# Patient Record
Sex: MALE | Race: WHITE | Employment: OTHER | URBAN - METROPOLITAN AREA
[De-identification: names, ages, dates, MRNs, and addresses within clinical notes are randomized per-mention and may not be internally consistent; named-entity substitution may affect disease eponyms.]

---

## 2018-12-05 ENCOUNTER — HOSPITAL ENCOUNTER (OUTPATIENT)
Age: 63
Setting detail: SPECIMEN
Discharge: HOME | End: 2018-12-05

## 2018-12-05 PROCEDURE — 86900 BLOOD TYPING SEROLOGIC ABO: CPT

## 2018-12-05 PROCEDURE — 86850 RBC ANTIBODY SCREEN: CPT

## 2018-12-05 PROCEDURE — 86901 BLOOD TYPING SEROLOGIC RH(D): CPT

## 2019-07-11 ENCOUNTER — APPOINTMENT (OUTPATIENT)
Dept: GENERAL RADIOLOGY | Age: 64
End: 2019-07-11
Payer: COMMERCIAL

## 2019-07-11 ENCOUNTER — HOSPITAL ENCOUNTER (EMERGENCY)
Age: 64
Discharge: HOME | End: 2019-07-11
Payer: COMMERCIAL

## 2019-07-11 VITALS
HEIGHT: 73 IN | BODY MASS INDEX: 26.51 KG/M2 | SYSTOLIC BLOOD PRESSURE: 141 MMHG | DIASTOLIC BLOOD PRESSURE: 82 MMHG | RESPIRATION RATE: 18 BRPM | OXYGEN SATURATION: 98 % | WEIGHT: 200 LBS | HEART RATE: 84 BPM | TEMPERATURE: 98 F

## 2019-07-11 DIAGNOSIS — S61.552A DOG BITE OF LEFT WRIST, INITIAL ENCOUNTER: ICD-10-CM
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
## 2019-07-11 DIAGNOSIS — S61.452A DOG BITE OF LEFT HAND, INITIAL ENCOUNTER: Primary | ICD-10-CM

## 2019-07-11 PROCEDURE — 6370000000 HC RX 637 (ALT 250 FOR IP): Performed by: PHYSICIAN ASSISTANT

## 2019-07-11 PROCEDURE — 73130 X-RAY EXAM OF HAND: CPT

## 2019-07-11 PROCEDURE — 96372 THER/PROPH/DIAG INJ SC/IM: CPT

## 2019-07-11 PROCEDURE — 6360000002 HC RX W HCPCS: Performed by: PHYSICIAN ASSISTANT

## 2019-07-11 PROCEDURE — 73100 X-RAY EXAM OF WRIST: CPT

## 2019-07-11 PROCEDURE — 99283 EMERGENCY DEPT VISIT LOW MDM: CPT

## 2019-07-11 RX ORDER — AMOXICILLIN AND CLAVULANATE POTASSIUM 875; 125 MG/1; MG/1
1 TABLET, FILM COATED ORAL ONCE
Status: COMPLETED | OUTPATIENT
Start: 2019-07-11 | End: 2019-07-11

## 2019-07-11 RX ORDER — BACITRACIN, NEOMYCIN, POLYMYXIN B 400; 3.5; 5 [USP'U]/G; MG/G; [USP'U]/G
OINTMENT TOPICAL
Qty: 1 TUBE | Refills: 0 | Status: SHIPPED | OUTPATIENT
Start: 2019-07-11 | End: 2019-07-21

## 2019-07-11 RX ORDER — KETOROLAC TROMETHAMINE 30 MG/ML
30 INJECTION, SOLUTION INTRAMUSCULAR; INTRAVENOUS ONCE
Status: COMPLETED | OUTPATIENT
Start: 2019-07-11 | End: 2019-07-11

## 2019-07-11 RX ORDER — OXYCODONE HYDROCHLORIDE AND ACETAMINOPHEN 5; 325 MG/1; MG/1
1 TABLET ORAL ONCE
Status: COMPLETED | OUTPATIENT
Start: 2019-07-11 | End: 2019-07-11

## 2019-07-11 RX ORDER — GINSENG 100 MG
CAPSULE ORAL ONCE
Status: DISCONTINUED | OUTPATIENT
Start: 2019-07-11 | End: 2019-07-12 | Stop reason: HOSPADM

## 2019-07-11 RX ORDER — AMOXICILLIN AND CLAVULANATE POTASSIUM 875; 125 MG/1; MG/1
1 TABLET, FILM COATED ORAL 2 TIMES DAILY
Qty: 20 TABLET | Refills: 0 | Status: SHIPPED | OUTPATIENT
Start: 2019-07-11 | End: 2019-07-21

## 2019-07-11 RX ADMIN — OXYCODONE HYDROCHLORIDE AND ACETAMINOPHEN 1 TABLET: 5; 325 TABLET ORAL at 21:39

## 2019-07-11 RX ADMIN — KETOROLAC TROMETHAMINE 30 MG: 30 INJECTION, SOLUTION INTRAMUSCULAR at 21:39

## 2019-07-11 RX ADMIN — AMOXICILLIN AND CLAVULANATE POTASSIUM 1 TABLET: 875; 125 TABLET, FILM COATED ORAL at 21:38

## 2019-07-11 ASSESSMENT — PAIN DESCRIPTION - PAIN TYPE: TYPE: ACUTE PAIN

## 2019-07-11 ASSESSMENT — PAIN SCALES - GENERAL: PAINLEVEL_OUTOF10: 8

## 2019-07-11 ASSESSMENT — PAIN DESCRIPTION - LOCATION: LOCATION: HAND

## 2019-07-11 ASSESSMENT — PAIN DESCRIPTION - ORIENTATION: ORIENTATION: LEFT

## 2019-07-12 NOTE — ED PROVIDER NOTES
eMERGENCY dEPARTMENT eNCOUnter         [address] [facility]    PCP: Brendolyn Hatchet, MD    CHIEF COMPLAINT    Chief Complaint   Patient presents with    Animal Bite     left arm    Arm Injury     left       HPI    Darion Mcgregor is a 60 y.o. male who presents with dog bite wound to the left hand/wrist.  Onset was at 5:30 PM this evening. Context is patient was trying to break up a fight between 2 dogs when 1 of the dogs bit him to the left hand. He sustained multiple puncture wounds to the hand dorsum, dorsal aspect of the wrist, third digit and midline proximal palm of the hand. Bleeding is controlled prior to ED arrival with pressure dressing. Tetanus is up-to-date within the last 5 years. Patient is reporting 8/10 throbbing sharp pain throughout the left hand with difficulty with range of motion of the digits and wrist extension secondary to pain and swelling. Patient is on chronic pain management with oral Vicodin for history of RA and generalized joint pain. Denying any other bite wounds to the head/face or other extremities. Dog involved in the injury is a household pet and reported to be up-to-date with all immunizations. Patient is not a diabetic. He is right-handed. Denying any numbness tingling or weakness rating to the distal left digits. No injury approximately in the left upper arm. REVIEW OF SYSTEMS    General: Denies fever or chills  Cardiac: Denies chest pain or chestwall pain. Pulmonary: Denies shortness of breath  GI: Denies abdominal pain, vomiting, or diarrhea  : No dysuria or hematuria    Denies any other muscles skeletal injuries, including elbow, shoulder,chest wall and back.     All other review of systems are negative  See HPI and nursing notes for additional information     PAST MEDICAL & SURGICAL HISTORY    Past Medical History:   Diagnosis Date    Arthritis     CAD (coronary artery disease)     Hyperlipidemia     Inability: None    Transportation needs:     Medical: None     Non-medical: None   Tobacco Use    Smoking status: Current Every Day Smoker     Packs/day: 1.00     Types: Cigarettes    Smokeless tobacco: Never Used   Substance and Sexual Activity    Alcohol use: No    Drug use: No    Sexual activity: Yes     Partners: Female   Lifestyle    Physical activity:     Days per week: None     Minutes per session: None    Stress: None   Relationships    Social connections:     Talks on phone: None     Gets together: None     Attends Spiritism service: None     Active member of club or organization: None     Attends meetings of clubs or organizations: None     Relationship status: None    Intimate partner violence:     Fear of current or ex partner: None     Emotionally abused: None     Physically abused: None     Forced sexual activity: None   Other Topics Concern    None   Social History Narrative    None     History reviewed. No pertinent family history. PHYSICAL EXAM    VITAL SIGNS: BP (!) 141/82   Pulse 84   Temp 98 °F (36.7 °C) (Oral)   Resp 18   Ht 6' 1\" (1.854 m)   Wt 200 lb (90.7 kg)   SpO2 98%   BMI 26.39 kg/m²   Constitutional:  Well developed, well nourished, no acute distress, non-toxic appearance   HENT:  Atraumatic. Normocephalic. Moist mucus membranes    Musculoskeletal:    Left Hand/Wrist: Multiple superficial non-gaping puncture wounds across the hand dorsum, dorsal aspect of the wrist joint with single puncture wound to the proximal midline palm and dorsal third digit over the PIP extensor joint line. Wounds are less than 0.5 cm in length without gaping with hand/anger movement or manual palpation. No pulsatile bleeding. Generalized soft tissue swelling throughout the hand and wrist dorsum without proximal red streaking redness warmth. No evidence of a rapidly expanding hematoma. Remaining palm and volar digits are atraumatic and nontender.   No obvious tendon involvement or

## 2021-05-11 ENCOUNTER — APPOINTMENT (OUTPATIENT)
Dept: CT IMAGING | Age: 66
DRG: 066 | End: 2021-05-11
Payer: COMMERCIAL

## 2021-05-11 ENCOUNTER — HOSPITAL ENCOUNTER (INPATIENT)
Age: 66
LOS: 2 days | Discharge: HOME | DRG: 066 | End: 2021-05-13
Attending: INTERNAL MEDICINE | Admitting: INTERNAL MEDICINE
Payer: COMMERCIAL

## 2021-05-11 DIAGNOSIS — I65.21 OCCLUSION OF RIGHT CAROTID ARTERY: ICD-10-CM

## 2021-05-11 DIAGNOSIS — I63.519 CEREBRAL INFARCTION INVOLVING MIDDLE CEREBRAL ARTERY (HCC): ICD-10-CM

## 2021-05-11 DIAGNOSIS — I67.1 ANTERIOR COMMUNICATING ARTERY ANEURYSM: ICD-10-CM

## 2021-05-11 DIAGNOSIS — R42 DIZZINESS: Primary | ICD-10-CM

## 2021-05-11 LAB
ALBUMIN SERPL-MCNC: 3.8 GM/DL (ref 3.4–5)
ALP BLD-CCNC: 38 IU/L (ref 40–129)
ALT SERPL-CCNC: 11 U/L (ref 10–40)
ANION GAP SERPL CALCULATED.3IONS-SCNC: 8 MMOL/L (ref 4–16)
AST SERPL-CCNC: 17 IU/L (ref 15–37)
BANDED NEUTROPHILS ABSOLUTE COUNT: 0.04 K/CU MM
BANDED NEUTROPHILS RELATIVE PERCENT: 1 % (ref 5–11)
BILIRUB SERPL-MCNC: 0.1 MG/DL (ref 0–1)
BUN BLDV-MCNC: 14 MG/DL (ref 6–23)
CALCIUM SERPL-MCNC: 9.3 MG/DL (ref 8.3–10.6)
CHLORIDE BLD-SCNC: 104 MMOL/L (ref 99–110)
CO2: 22 MMOL/L (ref 21–32)
CREAT SERPL-MCNC: 1.1 MG/DL (ref 0.9–1.3)
DIFFERENTIAL TYPE: ABNORMAL
EOSINOPHILS ABSOLUTE: 0 K/CU MM
EOSINOPHILS RELATIVE PERCENT: 1 % (ref 0–3)
GFR AFRICAN AMERICAN: >60 ML/MIN/1.73M2
GFR NON-AFRICAN AMERICAN: >60 ML/MIN/1.73M2
GLUCOSE BLD-MCNC: 93 MG/DL (ref 70–99)
HCT VFR BLD CALC: 37.3 % (ref 42–52)
HEMOGLOBIN: 12.2 GM/DL (ref 13.5–18)
LYMPHOCYTES ABSOLUTE: 2.3 K/CU MM
LYMPHOCYTES RELATIVE PERCENT: 53 % (ref 24–44)
MCH RBC QN AUTO: 29 PG (ref 27–31)
MCHC RBC AUTO-ENTMCNC: 32.7 % (ref 32–36)
MCV RBC AUTO: 88.6 FL (ref 78–100)
MONOCYTES ABSOLUTE: 0.3 K/CU MM
MONOCYTES RELATIVE PERCENT: 7 % (ref 0–4)
PDW BLD-RTO: 14.6 % (ref 11.7–14.9)
PLATELET # BLD: 191 K/CU MM (ref 140–440)
PMV BLD AUTO: 10.3 FL (ref 7.5–11.1)
POTASSIUM SERPL-SCNC: 4.1 MMOL/L (ref 3.5–5.1)
RBC # BLD: 4.21 M/CU MM (ref 4.6–6.2)
REASON FOR REJECTION: NORMAL
REASON FOR REJECTION: NORMAL
REJECTED TEST: NORMAL
REJECTED TEST: NORMAL
SEGMENTED NEUTROPHILS ABSOLUTE COUNT: 1.6 K/CU MM
SEGMENTED NEUTROPHILS RELATIVE PERCENT: 38 % (ref 36–66)
SODIUM BLD-SCNC: 134 MMOL/L (ref 135–145)
TOTAL PROTEIN: 7.1 GM/DL (ref 6.4–8.2)
TROPONIN T: <0.01 NG/ML
WBC # BLD: 4.2 K/CU MM (ref 4–10.5)

## 2021-05-11 PROCEDURE — 85025 COMPLETE CBC W/AUTO DIFF WBC: CPT

## 2021-05-11 PROCEDURE — 6370000000 HC RX 637 (ALT 250 FOR IP): Performed by: PHYSICIAN ASSISTANT

## 2021-05-11 PROCEDURE — 6360000002 HC RX W HCPCS: Performed by: PHYSICIAN ASSISTANT

## 2021-05-11 PROCEDURE — 99285 EMERGENCY DEPT VISIT HI MDM: CPT

## 2021-05-11 PROCEDURE — 84484 ASSAY OF TROPONIN QUANT: CPT

## 2021-05-11 PROCEDURE — 93005 ELECTROCARDIOGRAM TRACING: CPT | Performed by: PHYSICIAN ASSISTANT

## 2021-05-11 PROCEDURE — 70498 CT ANGIOGRAPHY NECK: CPT

## 2021-05-11 PROCEDURE — 96374 THER/PROPH/DIAG INJ IV PUSH: CPT

## 2021-05-11 PROCEDURE — 70450 CT HEAD/BRAIN W/O DYE: CPT

## 2021-05-11 PROCEDURE — 36415 COLL VENOUS BLD VENIPUNCTURE: CPT

## 2021-05-11 PROCEDURE — G0378 HOSPITAL OBSERVATION PER HR: HCPCS

## 2021-05-11 PROCEDURE — 1200000000 HC SEMI PRIVATE

## 2021-05-11 PROCEDURE — 83036 HEMOGLOBIN GLYCOSYLATED A1C: CPT

## 2021-05-11 PROCEDURE — 6360000004 HC RX CONTRAST MEDICATION: Performed by: PHYSICIAN ASSISTANT

## 2021-05-11 PROCEDURE — 80053 COMPREHEN METABOLIC PANEL: CPT

## 2021-05-11 RX ORDER — HYDROCODONE BITARTRATE AND ACETAMINOPHEN 7.5; 325 MG/1; MG/1
1 TABLET ORAL ONCE
Status: COMPLETED | OUTPATIENT
Start: 2021-05-11 | End: 2021-05-11

## 2021-05-11 RX ORDER — ONDANSETRON 2 MG/ML
4 INJECTION INTRAMUSCULAR; INTRAVENOUS EVERY 30 MIN PRN
Status: DISCONTINUED | OUTPATIENT
Start: 2021-05-11 | End: 2021-05-11

## 2021-05-11 RX ORDER — POLYETHYLENE GLYCOL 3350 17 G/17G
17 POWDER, FOR SOLUTION ORAL DAILY PRN
Status: DISCONTINUED | OUTPATIENT
Start: 2021-05-11 | End: 2021-05-13 | Stop reason: HOSPADM

## 2021-05-11 RX ORDER — ROPINIROLE 1 MG/1
2 TABLET, FILM COATED ORAL 2 TIMES DAILY
Status: DISCONTINUED | OUTPATIENT
Start: 2021-05-12 | End: 2021-05-13 | Stop reason: HOSPADM

## 2021-05-11 RX ORDER — NICOTINE 21 MG/24HR
1 PATCH, TRANSDERMAL 24 HOURS TRANSDERMAL ONCE
Status: COMPLETED | OUTPATIENT
Start: 2021-05-12 | End: 2021-05-12

## 2021-05-11 RX ORDER — ATORVASTATIN CALCIUM 80 MG/1
80 TABLET, FILM COATED ORAL NIGHTLY
Status: DISCONTINUED | OUTPATIENT
Start: 2021-05-12 | End: 2021-05-13 | Stop reason: HOSPADM

## 2021-05-11 RX ORDER — SODIUM CHLORIDE 0.9 % (FLUSH) 0.9 %
5-40 SYRINGE (ML) INJECTION PRN
Status: DISCONTINUED | OUTPATIENT
Start: 2021-05-11 | End: 2021-05-13 | Stop reason: HOSPADM

## 2021-05-11 RX ORDER — HYDROCODONE BITARTRATE AND ACETAMINOPHEN 7.5; 325 MG/1; MG/1
1 TABLET ORAL EVERY 8 HOURS PRN
Status: DISCONTINUED | OUTPATIENT
Start: 2021-05-11 | End: 2021-05-12

## 2021-05-11 RX ORDER — LISINOPRIL 2.5 MG/1
2.5 TABLET ORAL DAILY
Status: DISCONTINUED | OUTPATIENT
Start: 2021-05-12 | End: 2021-05-13 | Stop reason: HOSPADM

## 2021-05-11 RX ORDER — AMLODIPINE BESYLATE 10 MG/1
10 TABLET ORAL DAILY
Status: DISCONTINUED | OUTPATIENT
Start: 2021-05-12 | End: 2021-05-12

## 2021-05-11 RX ORDER — CARVEDILOL 12.5 MG/1
12.5 TABLET ORAL 2 TIMES DAILY
Status: DISCONTINUED | OUTPATIENT
Start: 2021-05-12 | End: 2021-05-12

## 2021-05-11 RX ORDER — TRAZODONE HYDROCHLORIDE 100 MG/1
100 TABLET ORAL NIGHTLY
COMMUNITY

## 2021-05-11 RX ORDER — RANOLAZINE 500 MG/1
500 TABLET, EXTENDED RELEASE ORAL 2 TIMES DAILY
Status: DISCONTINUED | OUTPATIENT
Start: 2021-05-12 | End: 2021-05-13 | Stop reason: HOSPADM

## 2021-05-11 RX ORDER — SODIUM CHLORIDE 0.9 % (FLUSH) 0.9 %
5-40 SYRINGE (ML) INJECTION 2 TIMES DAILY
Status: DISCONTINUED | OUTPATIENT
Start: 2021-05-12 | End: 2021-05-13 | Stop reason: HOSPADM

## 2021-05-11 RX ORDER — PROMETHAZINE HYDROCHLORIDE 25 MG/1
12.5 TABLET ORAL EVERY 6 HOURS PRN
Status: DISCONTINUED | OUTPATIENT
Start: 2021-05-11 | End: 2021-05-13 | Stop reason: HOSPADM

## 2021-05-11 RX ORDER — NICOTINE 21 MG/24HR
1 PATCH, TRANSDERMAL 24 HOURS TRANSDERMAL DAILY
Status: DISCONTINUED | OUTPATIENT
Start: 2021-05-12 | End: 2021-05-13 | Stop reason: HOSPADM

## 2021-05-11 RX ORDER — SODIUM CHLORIDE 9 MG/ML
25 INJECTION, SOLUTION INTRAVENOUS PRN
Status: DISCONTINUED | OUTPATIENT
Start: 2021-05-11 | End: 2021-05-13 | Stop reason: HOSPADM

## 2021-05-11 RX ORDER — ONDANSETRON 2 MG/ML
4 INJECTION INTRAMUSCULAR; INTRAVENOUS EVERY 6 HOURS PRN
Status: DISCONTINUED | OUTPATIENT
Start: 2021-05-11 | End: 2021-05-13 | Stop reason: HOSPADM

## 2021-05-11 RX ORDER — ASPIRIN 81 MG/1
81 TABLET, CHEWABLE ORAL DAILY
Status: DISCONTINUED | OUTPATIENT
Start: 2021-05-12 | End: 2021-05-13 | Stop reason: HOSPADM

## 2021-05-11 RX ADMIN — HYDROCODONE BITARTRATE AND ACETAMINOPHEN 1 TABLET: 7.5; 325 TABLET ORAL at 18:19

## 2021-05-11 RX ADMIN — ONDANSETRON 4 MG: 2 INJECTION INTRAMUSCULAR; INTRAVENOUS at 17:29

## 2021-05-11 RX ADMIN — IOPAMIDOL 75 ML: 755 INJECTION, SOLUTION INTRAVENOUS at 19:21

## 2021-05-11 ASSESSMENT — PAIN SCALES - GENERAL: PAINLEVEL_OUTOF10: 5

## 2021-05-11 NOTE — ED NOTES
Pt requesting Norco 7.5 at 1800, [address] notified      [facility]  05/11/21 7751

## 2021-05-11 NOTE — ED PROVIDER NOTES
EMERGENCY DEPARTMENT ENCOUNTER    [facility] EMERGENCY DEPARTMENT        TRIAGE CHIEF COMPLAINT:   Other (sent by cardiologist for angiogram)      [place]:  Milli Jaramillo is a 61 y.o. male that presents for abnormal outpatient carotid ultrasound. Patient states that for the last month, he has been having intermittent episodes of dizziness lightheadedness and blurred vision. He thought his symptoms were secondary to a recent prescription change in his eyeglasses. Has also had intermittent headaches as well as palpitations. Denies any facial droop, difficulty with speech or localized extremity numbness tingling or weakness. Patient does have a significant cardiac history with 7 stents, is on Brilinta. Was seen by his cardiologist Dr. Stacy Hayes who did perform a carotid ultrasound and was called to come to the emergency department as he \"had a complete blockage of his right carotid artery. \"  He states he was sent to the emergency department for an angiogram.  Patient is endorsing nausea and lightheadedness at this time. No previous history of CVA/TIA. No recent fall or head injuries. No new numbness tingling or weakness in the extremities. Denying any chest pain palpitation shortness of breath or lower leg swelling.     ROS:  General:  No fevers, no chills  Cardiovascular:  No chest pain, no palpitations  Respiratory:  No shortness of breath, no cough, no wheezing  Gastrointestinal:  No pain, no nausea, no vomiting, no diarrhea  Musculoskeletal:  No muscle pain, no joint pain  Skin:  No rash, no pruritis, no easy bruising  Neurologic:  No speech problems, no headache, no extremity numbness, no extremity tingling, no extremity weakness  Psychiatric:  No anxiety  Genitourinary:  No dysuria, no hematuria  Extremities:  No edema    Past Medical History:   Diagnosis Date    Arthritis     CAD (coronary artery disease)     Hyperlipidemia     Hypertension     Pulmonary Rozina Pacheco PA-C   4 mg at 05/11/21 1722     Current Outpatient Medications   Medication Sig Dispense Refill    ibuprofen (ADVIL;MOTRIN) 800 MG tablet Take 1 tablet by mouth every 6 hours as needed for Pain 30 tablet 0    amLODIPine (NORVASC) 10 MG tablet Take 1 tablet by mouth daily 30 tablet 3    ranolazine (RANEXA) 500 MG extended release tablet Take 500 mg by mouth 2 times daily      dicyclomine (BENTYL) 20 MG tablet Take 1 tablet by mouth 4 times daily (before meals and nightly) 120 tablet 3    carvedilol (COREG) 12.5 MG tablet Take 12.5 mg by mouth 2 times daily   5    lisinopril (PRINIVIL;ZESTRIL) 5 MG tablet Take 2.5 mg by mouth daily   11    ticagrelor (BRILINTA) 90 MG TABS tablet Take 90 mg by mouth 2 times daily      rOPINIRole (REQUIP) 1 MG tablet Take 2 mg by mouth 2 times daily       simvastatin (ZOCOR) 40 MG tablet Take 40 mg by mouth nightly.  aspirin 81 MG chewable tablet Take 81 mg by mouth daily. Allergies   Allergen Reactions    Codeine Nausea And Vomiting    Amiodarone     Dicyclomine        Nursing Notes Reviewed  PHYSICAL EXAM    VITAL SIGNS: BP (!) 143/91   Pulse 64   Temp 98.1 °F (36.7 °C) (Oral)   Resp 17   Ht 6' 1\" (1.854 m)   Wt 190 lb (86.2 kg)   SpO2 98%   BMI 25.07 kg/m²    Constitutional:  Well developed, Well nourished, In no acute distress, pleasant cooperative   Head:  Normocephalic, Atraumatic  Eyes: PERRL. EOMI. No nystagmus. Sclera clear. Conjunctiva normal, No discharge. Neck/Lymphatics: Supple, no JVD. Trachea is midline  Cardiovascular:  RRR, Normal S1 & S2  No murmurs/gallops/rubs  Peripheral Vascular: Distal pulses 2+, Capillary refill <2seconds  Respiratory:  Respirations nonnlabored, Clear to auscultation bilaterally, No retractions  Abdomen: Bowel sounds normal in all quadrants, Soft, Non tender/Nondistended, No palpable abdominal masses.   Musculoskeletal: BUE/BLE symmetrical without atrophy or deformities  Integument:  Warm, Dry, Intact, Skin turgor and texture normal  Neurologic:  Alert & oriented x3 , No focal deficits noted. Cranial nerves II through XII grossly intact. No slurred speech. No facial droop. Normal gross motor coordination & motor strength bilateral upper and lower extremities. Sensation intact. No pronator drift.   Psychiatric:  Affect appropriate      I have reviewed and interpreted all of the currently available lab results from this visit (if applicable):  Results for orders placed or performed during the hospital encounter of 05/11/21   CBC auto diff   Result Value Ref Range    WBC 4.2 4.0 - 10.5 K/CU MM    RBC 4.21 (L) 4.6 - 6.2 M/CU MM    Hemoglobin 12.2 (L) 13.5 - 18.0 GM/DL    Hematocrit 37.3 (L) 42 - 52 %    MCV 88.6 78 - 100 FL    MCH 29.0 27 - 31 PG    MCHC 32.7 32.0 - 36.0 %    RDW 14.6 11.7 - 14.9 %    Platelets 282 278 - 544 K/CU MM    MPV 10.3 7.5 - 11.1 FL    Bands Relative 1 (L) 5 - 11 %    Segs Relative 38.0 36 - 66 %    Eosinophils % 1.0 0 - 3 %    Lymphocytes % 53.0 (H) 24 - 44 %    Monocytes % 7.0 (H) 0 - 4 %    Bands Absolute 0.04 K/CU MM    Segs Absolute 1.6 K/CU MM    Eosinophils Absolute 0.0 K/CU MM    Lymphocytes Absolute 2.3 K/CU MM    Monocytes Absolute 0.3 K/CU MM    Differential Type MANUAL DIFFERENTIAL    SPECIMEN REJECTION   Result Value Ref Range    Rejected Test CHEM     Reason for Rejection UNABLE TO PERFORM TESTING:    SPECIMEN REJECTION   Result Value Ref Range    Rejected Test CHEM     Reason for Rejection UNABLE TO PERFORM TESTING:    Comprehensive Metabolic Panel   Result Value Ref Range    Sodium 134 (L) 135 - 145 MMOL/L    Potassium 4.1 3.5 - 5.1 MMOL/L    Chloride 104 99 - 110 mMol/L    CO2 22 21 - 32 MMOL/L    BUN 14 6 - 23 MG/DL    CREATININE 1.1 0.9 - 1.3 MG/DL    Glucose 93 70 - 99 MG/DL    Calcium 9.3 8.3 - 10.6 MG/DL    Albumin 3.8 3.4 - 5.0 GM/DL    Total Protein 7.1 6.4 - 8.2 GM/DL    Total Bilirubin 0.1 0.0 - 1.0 MG/DL    ALT 11 10 - 40 U/L    AST 17 15 - 37 IU/L Alkaline Phosphatase 38 (L) 40 - 129 IU/L    GFR Non-African American >60 >60 mL/min/1.73m2    GFR African American >60 >60 mL/min/1.73m2    Anion Gap 8 4 - 16   Troponin   Result Value Ref Range    Troponin T <0.010 <0.01 NG/ML        Radiographs (if obtained):  [] The following radiograph was interpreted by myself in the absence of a radiologist:   [] Radiologist's Report Reviewed:  CTA HEAD NECK W CONTRAST   Final Result   1. No acute intracranial abnormality. Chronic microvascular disease with   remote right posterior MCA distribution infarct. 2. Right common carotid occlusion with reconstitution of the right ICA at the   level the right-sided bifurcation, likely from the external carotid   circulation. 3. Mild-to-moderate narrowing involving the both proximal ostium of the   vertebral arteries otherwise large vessel occlusion or hemodynamic stenosis. 4. 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 CT HEAD WO CONTRAST   Final Result   1. No acute intracranial abnormality. Chronic microvascular disease with   remote right posterior MCA distribution infarct. 2. Right common carotid occlusion with reconstitution of the right ICA at the   level the right-sided bifurcation, likely from the external carotid   circulation. 3. Mild-to-moderate narrowing involving the both proximal ostium of the   vertebral arteries otherwise large vessel occlusion or hemodynamic stenosis. 4. 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                 CTA HEAD NECK W CONTRAST (Final result)  Result time 05/11/21 20:12:56  Final result by Dianelys Rose MD (05/11/21 20:12:56)                Impression:    1. No acute intracranial abnormality.  Chronic microvascular disease with   remote right posterior MCA distribution infarct. 2. Right common carotid occlusion with reconstitution of the right ICA at the   level the right-sided bifurcation, likely from the external carotid   circulation.    3. Mild-to-moderate narrowing involving the unremarkable.  No acute   abnormality of the salivary and thyroid glands. BONES: Multilevel degenerate change. CTA HEAD:     ANTERIOR CIRCULATION: No significant stenosis of the intracranial internal   carotid, anterior cerebral, or middle cerebral arteries.  3 mm aneurysm right   A-comm. Sueellen Payment POSTERIOR CIRCULATION: No significant stenosis of the vertebral, basilar, or   posterior cerebral arteries. No aneurysm. OTHER: No dural venous sinus thrombosis on this non-dedicated study                     CT HEAD WO CONTRAST (Final result)  Result time 05/11/21 20:12:56  Final result by Manasa Byrnes MD (05/11/21 20:12:56)                Impression:    1. No acute intracranial abnormality.  Chronic microvascular disease with   remote right posterior MCA distribution infarct. 2. Right common carotid occlusion with reconstitution of the right ICA at the   level the right-sided bifurcation, likely from the external carotid   circulation. 3. Mild-to-moderate narrowing involving the both proximal ostium of the   vertebral arteries otherwise large vessel occlusion or hemodynamic stenosis. 4. 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 Narrative:    EXAMINATION:   CTA OF THE HEAD AND NECK WITH CONTRAST; CT OF THE HEAD WITHOUT CONTRAST   5/11/2021 7:17 pm:     TECHNIQUE:   CTA of the head and neck was performed with the administration of intravenous   contrast. Multiplanar reformatted images are provided for review.  MIP images   are provided for review. Stenosis of the internal carotid arteries measured   using NASCET criteria.  Dose modulation, iterative reconstruction, and/or   weight based adjustment of the mA/kV was utilized to reduce the radiation   dose to as low as reasonably achievable.; CT of the head was performed   without the administration of intravenous contrast. Dose modulation,   iterative reconstruction, and/or weight based adjustment of the mA/kV was   utilized to reduce the radiation dose to as low as reasonably achievable. Noncontrast CT of the head with reconstructed 2-D images are also provided   for review. COMPARISON:   None. HISTORY:   ORDERING SYSTEM PROVIDED HISTORY: abnormal carotid US, eval for occlusion   right common carotid   TECHNOLOGIST PROVIDED HISTORY:   Reason for exam:->abnormal carotid US, eval for occlusion right common carotid   Decision Support Exception - unselect if not a suspected or confirmed   emergency medical condition->Emergency Medical Condition (MA)   Reason for Exam: abnormal carotid US, eval for occlusion right common carotid   Acuity: Unknown   Type of Exam: Unknown; ORDERING SYSTEM PROVIDED HISTORY: dizziness, blurred   vision x1 month   TECHNOLOGIST PROVIDED HISTORY:   Has a \"code stroke\" or \"stroke alert\" been called? ->No   Reason for exam:->dizziness, blurred vision x1 month   Decision Support Exception - unselect if not a suspected or confirmed   emergency medical condition->Emergency Medical Condition (MA)   Reason for Exam: dizziness,blurred vision for 1 month   Acuity: Unknown   Type of Exam: Unknown     FINDINGS:     CT HEAD:     BRAIN/VENTRICLES:  No acute intracranial hemorrhage or extraaxial fluid   collection. Grey-white differentiation is maintained.  No evidence of mass,   mass effect or midline shift.  No evidence of hydrocephalus.  Small focus of   gliosis right postcentral gyrus likely related to remote infarct.  Otherwise   mild chronic white matter changes of chronic microvascular disease. Intracranial vascular calcifications. ORBITS: The visualized portion of the orbits demonstrate no acute abnormality. SINUSES:  Minor ethmoid sinus disease. SOFT TISSUES/SKULL: No acute abnormality of the visualized skull or soft   tissues. CTA NECK:     AORTIC ARCH/ARCH VESSELS: No dissection or arterial injury.  Moderate focal   plaque identified involving the right brachiocephalic and left subclavian   causing mild narrowing. pertinent Lab data and radiographic results reviewed with patient at bedside. The patient and/or the family were informed of the results of any tests/labs/imaging, the treatment plan, and time was allotted to answer questions. Clinical Impression:  1. Dizziness    2. Occlusion of right carotid artery    3. Anterior communicating artery aneurysm    4. Cerebral infarction involving middle cerebral artery [facility])        Patient presents for abnormal outpatient carotid ultrasound. On exam, well-appearing pleasant 24-year-old male, sitting upright in bed, pleasant cooperative. Nonfocal neurological exam.  Cranial nerves II to XII grossly intact. Spontaneously moving all extremities with equal strength range of motion and sensation. Unremarkable cardiopulmonary exam.  Abdomen soft nontender. She was placed on to telemetry continuous pulse ox monitoring. Per carotid Doppler results, there was total occlusion of the right common carotid artery with less than 50% stenosis of the bilateral internal carotid artery and recommending for carotid angiogram.  Did order CT head and CTA head and neck imaging at this time. CBC with normal white count. Hemoglobin is 2.1. CMP without significant derangement electrolyte disturbance. Normal troponin. CT head and CTA head and neck with contrast reveals no acute intracranial abnormality but there is chronic microvascular disease with remote right posterior MCA distribution infarct. There is occlusion of the right common carotid to the level of the bifurcation mild to moderate narrowing involving both proximal ostium of the vertebral arteries without evidence of hemodynamic stenosis. Also incidental finding 3 mm a comm aneurysm on the right.     I did consult with on-call cardiovascular surgeon, Dr. Franco Garcia however as this occlusion likely appears chronic, does not recommend for any emergent intervention at this time and patient can be admitted for further evaluation and management. Also spoke with neurosurgeon, Dr. Naldo Castro given the aneurysm finding and remote MCA distribution infarct, again without any indication for emergent intervention. Patient would not be a TPA candidate given the length of the symptoms. I did consult with Sukhjinder Da Silva NP - hospitalist - and discussed patient's history, ED presentation/course including any pertinent laboratory findings and imaging study findings. He/she who agrees to hospital admission. Patient is admitted to the hospital in stable condition. In consideration of current COVID19 pandemic, with effort to minimize unnecessary provider exposure, this patient was seen at bedside by me independently. However, in compliance with current hospital RANJITH/ED protocol, prior to admission I did discuss this patient case with emergency department physician, Dr. Morris Hernandez, who did agree with ED workup/evaluation and plan for admission. Of note, this Pt was NOT admitted to the ICU. Diagnosis and plan discussed in detail with patient who understands and agrees    Disposition referral (if applicable):  No follow-up provider specified.     Disposition medications (if applicable):  New Prescriptions    No medications on file         (Please note that portions of this note may have been completed with a voice recognition program. Efforts were made to edit the dictations but occasionally words are mis-transcribed.)          Apurva Francois PA-C  05/11/21 2050

## 2021-05-12 ENCOUNTER — APPOINTMENT (OUTPATIENT)
Dept: MRI IMAGING | Age: 66
DRG: 066 | End: 2021-05-12
Payer: COMMERCIAL

## 2021-05-12 LAB
ALBUMIN SERPL-MCNC: 3.9 GM/DL (ref 3.4–5)
ALP BLD-CCNC: 44 IU/L (ref 40–128)
ALT SERPL-CCNC: 11 U/L (ref 10–40)
ANION GAP SERPL CALCULATED.3IONS-SCNC: 9 MMOL/L (ref 4–16)
AST SERPL-CCNC: 15 IU/L (ref 15–37)
BILIRUB SERPL-MCNC: 0.2 MG/DL (ref 0–1)
BUN BLDV-MCNC: 15 MG/DL (ref 6–23)
CALCIUM SERPL-MCNC: 8.9 MG/DL (ref 8.3–10.6)
CHLORIDE BLD-SCNC: 103 MMOL/L (ref 99–110)
CHOLESTEROL: 98 MG/DL
CO2: 27 MMOL/L (ref 21–32)
CREAT SERPL-MCNC: 1.2 MG/DL (ref 0.9–1.3)
EKG ATRIAL RATE: 66 BPM
EKG DIAGNOSIS: NORMAL
EKG P AXIS: 51 DEGREES
EKG P-R INTERVAL: 138 MS
EKG Q-T INTERVAL: 414 MS
EKG QRS DURATION: 114 MS
EKG QTC CALCULATION (BAZETT): 434 MS
EKG R AXIS: 44 DEGREES
EKG T AXIS: 21 DEGREES
EKG VENTRICULAR RATE: 66 BPM
ESTIMATED AVERAGE GLUCOSE: 108 MG/DL
GFR AFRICAN AMERICAN: >60 ML/MIN/1.73M2
GFR NON-AFRICAN AMERICAN: >60 ML/MIN/1.73M2
GLUCOSE BLD-MCNC: 92 MG/DL (ref 70–99)
HBA1C MFR BLD: 5.4 % (ref 4.2–6.3)
HCT VFR BLD CALC: 36.6 % (ref 42–52)
HDLC SERPL-MCNC: 41 MG/DL
HEMOGLOBIN: 11.9 GM/DL (ref 13.5–18)
LDL CHOLESTEROL DIRECT: 47 MG/DL
LV EF: 48 %
LVEF MODALITY: NORMAL
MCH RBC QN AUTO: 29 PG (ref 27–31)
MCHC RBC AUTO-ENTMCNC: 32.5 % (ref 32–36)
MCV RBC AUTO: 89.3 FL (ref 78–100)
PDW BLD-RTO: 14.6 % (ref 11.7–14.9)
PLATELET # BLD: 169 K/CU MM (ref 140–440)
PMV BLD AUTO: 9.8 FL (ref 7.5–11.1)
POTASSIUM SERPL-SCNC: 4 MMOL/L (ref 3.5–5.1)
RBC # BLD: 4.1 M/CU MM (ref 4.6–6.2)
SODIUM BLD-SCNC: 139 MMOL/L (ref 135–145)
TOTAL PROTEIN: 6.5 GM/DL (ref 6.4–8.2)
TRIGL SERPL-MCNC: 101 MG/DL
TROPONIN T: <0.01 NG/ML
TROPONIN T: <0.01 NG/ML
WBC # BLD: 4.4 K/CU MM (ref 4–10.5)

## 2021-05-12 PROCEDURE — 80061 LIPID PANEL: CPT

## 2021-05-12 PROCEDURE — 80053 COMPREHEN METABOLIC PANEL: CPT

## 2021-05-12 PROCEDURE — 6370000000 HC RX 637 (ALT 250 FOR IP): Performed by: PHYSICIAN ASSISTANT

## 2021-05-12 PROCEDURE — 97165 OT EVAL LOW COMPLEX 30 MIN: CPT

## 2021-05-12 PROCEDURE — 6370000000 HC RX 637 (ALT 250 FOR IP): Performed by: NURSE PRACTITIONER

## 2021-05-12 PROCEDURE — 97161 PT EVAL LOW COMPLEX 20 MIN: CPT

## 2021-05-12 PROCEDURE — 97530 THERAPEUTIC ACTIVITIES: CPT

## 2021-05-12 PROCEDURE — 6370000000 HC RX 637 (ALT 250 FOR IP): Performed by: INTERNAL MEDICINE

## 2021-05-12 PROCEDURE — 83721 ASSAY OF BLOOD LIPOPROTEIN: CPT

## 2021-05-12 PROCEDURE — 84484 ASSAY OF TROPONIN QUANT: CPT

## 2021-05-12 PROCEDURE — 70551 MRI BRAIN STEM W/O DYE: CPT

## 2021-05-12 PROCEDURE — 83036 HEMOGLOBIN GLYCOSYLATED A1C: CPT

## 2021-05-12 PROCEDURE — 96372 THER/PROPH/DIAG INJ SC/IM: CPT

## 2021-05-12 PROCEDURE — 1200000000 HC SEMI PRIVATE

## 2021-05-12 PROCEDURE — 97116 GAIT TRAINING THERAPY: CPT

## 2021-05-12 PROCEDURE — 99223 1ST HOSP IP/OBS HIGH 75: CPT | Performed by: NURSE PRACTITIONER

## 2021-05-12 PROCEDURE — 93010 ELECTROCARDIOGRAM REPORT: CPT | Performed by: INTERNAL MEDICINE

## 2021-05-12 PROCEDURE — 6360000002 HC RX W HCPCS: Performed by: NURSE PRACTITIONER

## 2021-05-12 PROCEDURE — G0378 HOSPITAL OBSERVATION PER HR: HCPCS

## 2021-05-12 PROCEDURE — 93306 TTE W/DOPPLER COMPLETE: CPT

## 2021-05-12 PROCEDURE — 70544 MR ANGIOGRAPHY HEAD W/O DYE: CPT

## 2021-05-12 PROCEDURE — 2580000003 HC RX 258: Performed by: NURSE PRACTITIONER

## 2021-05-12 PROCEDURE — 94761 N-INVAS EAR/PLS OXIMETRY MLT: CPT

## 2021-05-12 PROCEDURE — 36415 COLL VENOUS BLD VENIPUNCTURE: CPT

## 2021-05-12 PROCEDURE — 85027 COMPLETE CBC AUTOMATED: CPT

## 2021-05-12 RX ORDER — HYDROCODONE BITARTRATE AND ACETAMINOPHEN 7.5; 325 MG/1; MG/1
1 TABLET ORAL EVERY 6 HOURS PRN
Status: DISCONTINUED | OUTPATIENT
Start: 2021-05-12 | End: 2021-05-13 | Stop reason: HOSPADM

## 2021-05-12 RX ORDER — LIDOCAINE 4 G/G
1 PATCH TOPICAL NIGHTLY
Status: DISCONTINUED | OUTPATIENT
Start: 2021-05-12 | End: 2021-05-13 | Stop reason: HOSPADM

## 2021-05-12 RX ORDER — TRAZODONE HYDROCHLORIDE 50 MG/1
100 TABLET ORAL NIGHTLY
Status: DISCONTINUED | OUTPATIENT
Start: 2021-05-12 | End: 2021-05-13 | Stop reason: HOSPADM

## 2021-05-12 RX ADMIN — ROPINIROLE HYDROCHLORIDE 2 MG: 1 TABLET, FILM COATED ORAL at 20:44

## 2021-05-12 RX ADMIN — RANOLAZINE 500 MG: 500 TABLET, EXTENDED RELEASE ORAL at 20:44

## 2021-05-12 RX ADMIN — HYDROCODONE BITARTRATE AND ACETAMINOPHEN 1 TABLET: 7.5; 325 TABLET ORAL at 08:16

## 2021-05-12 RX ADMIN — RANOLAZINE 500 MG: 500 TABLET, EXTENDED RELEASE ORAL at 08:01

## 2021-05-12 RX ADMIN — TICAGRELOR 90 MG: 90 TABLET ORAL at 07:59

## 2021-05-12 RX ADMIN — LISINOPRIL 2.5 MG: 2.5 TABLET ORAL at 08:02

## 2021-05-12 RX ADMIN — TICAGRELOR 90 MG: 90 TABLET ORAL at 20:44

## 2021-05-12 RX ADMIN — METOPROLOL TARTRATE 25 MG: 25 TABLET, FILM COATED ORAL at 08:00

## 2021-05-12 RX ADMIN — HYDROCODONE BITARTRATE AND ACETAMINOPHEN 1 TABLET: 7.5; 325 TABLET ORAL at 14:33

## 2021-05-12 RX ADMIN — ENOXAPARIN SODIUM 40 MG: 40 INJECTION SUBCUTANEOUS at 07:59

## 2021-05-12 RX ADMIN — ASPIRIN 81 MG: 81 TABLET, CHEWABLE ORAL at 08:00

## 2021-05-12 RX ADMIN — RANOLAZINE 500 MG: 500 TABLET, EXTENDED RELEASE ORAL at 00:02

## 2021-05-12 RX ADMIN — SODIUM CHLORIDE, PRESERVATIVE FREE 10 ML: 5 INJECTION INTRAVENOUS at 00:02

## 2021-05-12 RX ADMIN — SODIUM CHLORIDE, PRESERVATIVE FREE 10 ML: 5 INJECTION INTRAVENOUS at 08:03

## 2021-05-12 RX ADMIN — ATORVASTATIN CALCIUM 80 MG: 80 TABLET, FILM COATED ORAL at 00:02

## 2021-05-12 RX ADMIN — TICAGRELOR 90 MG: 90 TABLET ORAL at 00:02

## 2021-05-12 RX ADMIN — ROPINIROLE HYDROCHLORIDE 2 MG: 1 TABLET, FILM COATED ORAL at 00:02

## 2021-05-12 RX ADMIN — SODIUM CHLORIDE, PRESERVATIVE FREE 10 ML: 5 INJECTION INTRAVENOUS at 20:45

## 2021-05-12 RX ADMIN — TRAZODONE HYDROCHLORIDE 100 MG: 50 TABLET ORAL at 20:44

## 2021-05-12 RX ADMIN — HYDROCODONE BITARTRATE AND ACETAMINOPHEN 1 TABLET: 7.5; 325 TABLET ORAL at 00:02

## 2021-05-12 RX ADMIN — METOPROLOL TARTRATE 25 MG: 25 TABLET, FILM COATED ORAL at 20:44

## 2021-05-12 RX ADMIN — HYDROCODONE BITARTRATE AND ACETAMINOPHEN 1 TABLET: 7.5; 325 TABLET ORAL at 20:44

## 2021-05-12 ASSESSMENT — PAIN DESCRIPTION - LOCATION: LOCATION: HAND;NECK

## 2021-05-12 ASSESSMENT — PAIN DESCRIPTION - PAIN TYPE: TYPE: CHRONIC PAIN

## 2021-05-12 ASSESSMENT — PAIN SCALES - GENERAL: PAINLEVEL_OUTOF10: 7

## 2021-05-12 NOTE — CONSULTS
Consult received for incidental 3 mm right anterior communicating artery aneurysm. No subarachnoid hemorrhage on CT. No other acute process seen on head CT or brain MRI. No further neurosurgical intervention at this time. Recommend outpatient follow-up with [address] [place] [address] Neurosurgery.

## 2021-05-12 NOTE — PROGRESS NOTES
left. Able to visually scan in all directions with [facility] acuity with glasses on.   · [facility]  · Cardiopulmonary:  Stable vitals on room air   · Orientation: [facility]     Musculoskeletal  · ROM R/L:  WFL BLEs   · Strength R/L:  BLEs grossly 5/5   · Neuro: intact LE coordination and sensation     Mobility/treatment:   · Rolling L/R:  indep   · Supine to sit:  indep from flat bed surface without use of bed rail   · Transfers: indep from EOB  · Sitting balance:  indep at EOB, static and light dynamic while managing shoes   · Standing balance:  SBA to indep, no AD   · Gait: 400ft with SBA for safety, no AD. Consistent/WFL pace with reciprocal gait pattern. No major deviations or LOB noted   · Educated pt on POC, role of PT, discharge. [facility] 6 Clicks Inpatient Mobility:  AM-PAC Inpatient Mobility Raw Score : 22    Safety: patient left in transport chair (leaving for MRI), RN notified, gait belt used. Assessment:  Pt is a 61year old male admitted with lightheadedness and blurriness. MRI showed chronic right parietal lobe infarct. No acute intracranial abnormality. Recommend home once medically stable. Pt performed near his typical baseline today. No further acute PT needs at this time. Complexity: Low   Prognosis: Good, no significant barriers to participation at this time.    Plan Times per week: n/a  Discharge Recommendations: Home independently  Equipment: no needs     Treatment plan:  Recommendations for NURSING mobility: ambulate in hallway as time permits     Time:   Time in: 0920  Time out: 0938  Timed treatment minutes: 8  Total time: 18    Electronically signed by:    Rao Bronson [id number]  5/12/2021, 11:47 AM

## 2021-05-12 NOTE — PROGRESS NOTES
Pt stated he doesn't take coreg or norvasc but takes lopressor 25 mg BID. Changes made.     Guanakito Rouse PA-C  Hospitalist  5/12/2021, 12:12 AM

## 2021-05-12 NOTE — H&P
History and Physical      Name:  Kevin Kaur /Age/Sex: 1955  (61 y.o. male)   MRN & CSN:  [id number] & [id number] Admission Date/Time: 2021  4:51 PM   Location:  ED15/ED-15 PCP: Flora Bonilla MD       Hospital Day: 1        Admitting Physician: Dr. Citlaly Brasher and Plan:   Kevin Kaur is a 61 y.o. male who presents with Other (sent by cardiologist for angiogram)     Persistent dizziness x 1 month. CTA:\"Chronic microvascular disease with   remote right posterior MCA distribution infarct\"   Admit [facility]/S[facility]   Neuro checks/ NIHSS   Telemetry    MRI pending   Echo pending   ASA/Statin on medication regimen   Swallow evaluation    PT/OT       Carotid occulusion CTA \"Right common carotid occlusion with reconstitution of the right ICA at the   level the right-sided bifurcation, 3 mm A-comm aneurysm on the right\"   CT surgery consulted   Neurosurgery consulted       CAD- DAPT/ Ranexa       Essential hypertension- continue home antihypertensive regimen- Monitor BP trends.  Tobacco Use   Smoking cessation protocol   Nicotine supplementation        Patient case discussed with ED provider    Diet Cardiac   DVT Prophylaxis [x] Lovenox, []  Heparin, [] SCDs, [] Ambulation  [] Long term AC   GI Prophylaxis [] PPI,  [] H2 Blocker,  [] Carafate,  [x] Diet/Tube Feeds   Code Status Full     Disposition Admit to inpatient. Patient plans to return home upon discharge   MDM [] Low, [x] Moderate,[]  High     -Patient assessment and plan discussed and reviewed with admitting physician: Barb Cowart MD.       History of Present Illness:     Chief Complaint: Other (sent by cardiologist for angiogram)    Kevin Kaur is a 61 y.o. male who presents at recommendation from cardiology office. Was following OP with work up for persistent dizziness X 1 month.   Outpatient carotid ultrasound was noted to be abnormal and referred to emergency room for evaluation/angiogram.  Describes as a lightheadedness. States change in position makes it worse. Associated HA, visual changes, and nausea. Denies fever, chills, sweats, CP, shortness of breath, wheezing, congestion, cough, abdominal pain, diarrhea, constipation, or dysuria. Ten point ROS: reviewed negative, unless as noted in above HPI. Objective:   No intake or output data in the 24 hours ending 05/11/21 2055     Vitals:   Vitals:    05/11/21 1704 05/11/21 1731 05/11/21 1741 05/11/21 1832   BP: (!) 152/94 138/77  (!) 143/91   Pulse: 75 67 63 64   Resp: 15 21 19 17   Temp:       TempSrc:       SpO2: 98% 98% 96% 98%   Weight:       Height:           Physical Exam: 05/11/21     GEN -Awake nontoxic appearing male, sitting upright in bed , NAD. normal body habitus. Appears given age. EYES -No scleral erythema, discharge, or conjunctivitis. HENT -MM are moist. Oral pharynx without exudates, no evidence of thrush. NECK -Supple, no apparent thyromegaly or masses. RESP -CTA, no wheezes, rales or rhonchi. Symmetric chest movement while on RA.   C/V -S1/S2 auscultated. RRR without appreciable M/R/G. No JVD or carotid bruits. Peripheral pulses equal bilaterally and palpable. Cap refill <3 sec. No peripheral edema. GI -Abdomen is soft non distended and without significant TTP. + BS. No masses or guarding. Rectal exam deferred. No HSM   -No CVA/ flank tenderness. Pereyra catheter is not present. LYMPH-No palpable cervical lymphadenopathy and no hepatosplenomegaly. No petechiae or ecchymoses. MS -No gross joint deformities. SKIN -Normal coloration, warm, dry. NEURO-Cranial nerves appear grossly intact, normal speech, no lateralizing weakness. PSYC-Awake, alert, oriented x 4- person, place, time, situation,  Appropriate affect.     Past Medical History:      Past Medical History:   Diagnosis Date    Arthritis     CAD (coronary artery disease)     Hyperlipidemia     Hypertension     Pulmonary fibrosis (HCC)     Restless leg syndrome Allergen Reactions    Codeine Nausea And Vomiting    Amiodarone     Dicyclomine        Home Medications:     Prior to Admission medications    Medication Sig Start Date End Date Taking? Authorizing Provider   ibuprofen (ADVIL;MOTRIN) 800 MG tablet Take 1 tablet by mouth every 6 hours as needed for Pain 4/5/18   Linda Cottrell PA-C   amLODIPine (NORVASC) 10 MG tablet Take 1 tablet by mouth daily 8/9/17   Jf Alamo MD   ranolazine (RANEXA) 500 MG extended release tablet Take 500 mg by mouth 2 times daily    Historical Provider, MD   dicyclomine (BENTYL) 20 MG tablet Take 1 tablet by mouth 4 times daily (before meals and nightly) 12/20/16   Papito Watkins MD   carvedilol (COREG) 12.5 MG tablet Take 12.5 mg by mouth 2 times daily  9/30/16   Historical Provider, MD   lisinopril (PRINIVIL;ZESTRIL) 5 MG tablet Take 2.5 mg by mouth daily  11/2/16   Historical Provider, MD   ticagrelor (BRILINTA) 90 MG TABS tablet Take 90 mg by mouth 2 times daily    Historical Provider, MD   rOPINIRole (REQUIP) 1 MG tablet Take 2 mg by mouth 2 times daily     Historical Provider, MD   simvastatin (ZOCOR) 40 MG tablet Take 40 mg by mouth nightly. Historical Provider, MD   aspirin 81 MG chewable tablet Take 81 mg by mouth daily. Historical Provider, MD     Medications:   Medications:    Infusions:   PRN Meds: ondansetron, 4 mg, Q30 Min PRN      Data:     Laboratory this visit:  Reviewed  Recent Labs     05/11/21  1712   WBC 4.2   HGB 12.2*   HCT 37.3*         Recent Labs     05/11/21  1808   *   K 4.1      CO2 22   BUN 14   CREATININE 1.1     Recent Labs     05/11/21  1808   AST 17   ALT 11   BILITOT 0.1   ALKPHOS 38*     No results for input(s): INR in the last 72 hours. Radiology this visit:  Reviewed.     Ct Head Wo Contrast    Result Date: 5/11/2021  EXAMINATION: CTA OF THE HEAD AND NECK WITH CONTRAST; CT OF THE HEAD WITHOUT CONTRAST 5/11/2021 7:17 pm: TECHNIQUE: CTA of the head and neck was performed with the administration of intravenous contrast. Multiplanar reformatted images are provided for review. MIP images are provided for review. Stenosis of the internal carotid arteries measured using NASCET criteria. Dose modulation, iterative reconstruction, and/or weight based adjustment of the mA/kV was utilized to reduce the radiation dose to as low as reasonably achievable.; CT of the head was performed without the administration of intravenous contrast. Dose modulation, iterative reconstruction, and/or weight based adjustment of the mA/kV was utilized to reduce the radiation dose to as low as reasonably achievable. Noncontrast CT of the head with reconstructed 2-D images are also provided for review. COMPARISON: None. HISTORY: ORDERING SYSTEM PROVIDED HISTORY: abnormal carotid US, eval for occlusion right common carotid TECHNOLOGIST PROVIDED HISTORY: Reason for exam:->abnormal carotid US, eval for occlusion right common carotid Decision Support Exception - unselect if not a suspected or confirmed emergency medical condition->Emergency Medical Condition ([place]) Reason for Exam: abnormal carotid US, eval for occlusion right common carotid Acuity: Unknown Type of Exam: Unknown; ORDERING SYSTEM PROVIDED HISTORY: dizziness, blurred vision x1 month TECHNOLOGIST PROVIDED HISTORY: Has a \"code stroke\" or \"stroke alert\" been called? ->No Reason for exam:->dizziness, blurred vision x1 month Decision Support Exception - unselect if not a suspected or confirmed emergency medical condition->Emergency Medical Condition (MA) Reason for Exam: dizziness,blurred vision for 1 month Acuity: Unknown Type of Exam: Unknown FINDINGS: CT HEAD: BRAIN/VENTRICLES:  No acute intracranial hemorrhage or extraaxial fluid collection. Grey-white differentiation is maintained. No evidence of mass, mass effect or midline shift. No evidence of hydrocephalus. Small focus of gliosis right postcentral gyrus likely related to remote infarct. Otherwise mild chronic white matter changes of chronic microvascular disease. Intracranial vascular calcifications. ORBITS: The visualized portion of the orbits demonstrate no acute abnormality. SINUSES:  Minor ethmoid sinus disease. SOFT TISSUES/SKULL: No acute abnormality of the visualized skull or soft tissues. CTA NECK: AORTIC ARCH/ARCH VESSELS: No dissection or arterial injury. Moderate focal plaque identified involving the right brachiocephalic and left subclavian causing mild narrowing. Otherwise, no hemodynamic stenosis significant stenosis of the brachiocephalic or subclavian arteries. CAROTID ARTERIES: On the right, the ostium of the right common carotid artery is patent but is proximal occluded. The right common carotid artery is occluded to level the bifurcation. Heavy calcifications involving the bifurcation noted. There is diminished caliber of the right ICA noted likely Reconstituted from the external carotid circulation. No focal areas of narrowing identified involving the right major of the right ICA. On the left, no dissection, arterial injury, or hemodynamically significant stenosis by NASCET criteria. VERTEBRAL ARTERIES: Moderate plaque and mild-to-moderate focal narrowing involving the ostium of the bilateral vertebral arteries. No dissection, arterial injury, or significant stenosis. SOFT TISSUES: The lung apices are clear. No cervical or superior mediastinal lymphadenopathy. The larynx and pharynx are unremarkable. No acute abnormality of the salivary and thyroid glands. BONES: Multilevel degenerate change. CTA HEAD: ANTERIOR CIRCULATION: No significant stenosis of the intracranial internal carotid, anterior cerebral, or middle cerebral arteries. 3 mm aneurysm right A-comm. Niurka Kid POSTERIOR CIRCULATION: No significant stenosis of the vertebral, basilar, or posterior cerebral arteries. No aneurysm. OTHER: No dural venous sinus thrombosis on this non-dedicated study     1.  No acute intracranial abnormality. Chronic microvascular disease with remote right posterior MCA distribution infarct. 2. Right common carotid occlusion with reconstitution of the right ICA at the level the right-sided bifurcation, likely from the external carotid circulation. 3. Mild-to-moderate narrowing involving the both proximal ostium of the vertebral arteries otherwise large vessel occlusion or hemodynamic stenosis. 4. 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 Cta Head Neck W Contrast    Result Date: 5/11/2021  EXAMINATION: CTA OF THE HEAD AND NECK WITH CONTRAST; CT OF THE HEAD WITHOUT CONTRAST 5/11/2021 7:17 pm: TECHNIQUE: CTA of the head and neck was performed with the administration of intravenous contrast. Multiplanar reformatted images are provided for review. MIP images are provided for review. Stenosis of the internal carotid arteries measured using NASCET criteria. Dose modulation, iterative reconstruction, and/or weight based adjustment of the mA/kV was utilized to reduce the radiation dose to as low as reasonably achievable.; CT of the head was performed without the administration of intravenous contrast. Dose modulation, iterative reconstruction, and/or weight based adjustment of the mA/kV was utilized to reduce the radiation dose to as low as reasonably achievable. Noncontrast CT of the head with reconstructed 2-D images are also provided for review. COMPARISON: None.  HISTORY: ORDERING SYSTEM PROVIDED HISTORY: abnormal carotid US, eval for occlusion right common carotid TECHNOLOGIST PROVIDED HISTORY: Reason for exam:->abnormal carotid US, eval for occlusion right common carotid Decision Support Exception - unselect if not a suspected or confirmed emergency medical condition->Emergency Medical Condition (MA) Reason for Exam: abnormal carotid US, eval for occlusion right common carotid Acuity: Unknown Type of Exam: Unknown; ORDERING SYSTEM PROVIDED HISTORY: dizziness, blurred vision x1 month TECHNOLOGIST PROVIDED HISTORY: Has a \"code stroke\" or \"stroke alert\" been called? ->No Reason for exam:->dizziness, blurred vision x1 month Decision Support Exception - unselect if not a suspected or confirmed emergency medical condition->Emergency Medical Condition (MA) Reason for Exam: dizziness,blurred vision for 1 month Acuity: Unknown Type of Exam: Unknown FINDINGS: CT HEAD: BRAIN/VENTRICLES:  No acute intracranial hemorrhage or extraaxial fluid collection. Grey-white differentiation is maintained. No evidence of mass, mass effect or midline shift. No evidence of hydrocephalus. Small focus of gliosis right postcentral gyrus likely related to remote infarct. Otherwise mild chronic white matter changes of chronic microvascular disease. Intracranial vascular calcifications. ORBITS: The visualized portion of the orbits demonstrate no acute abnormality. SINUSES:  Minor ethmoid sinus disease. SOFT TISSUES/SKULL: No acute abnormality of the visualized skull or soft tissues. CTA NECK: AORTIC ARCH/ARCH VESSELS: No dissection or arterial injury. Moderate focal plaque identified involving the right brachiocephalic and left subclavian causing mild narrowing. Otherwise, no hemodynamic stenosis significant stenosis of the brachiocephalic or subclavian arteries. CAROTID ARTERIES: On the right, the ostium of the right common carotid artery is patent but is proximal occluded. The right common carotid artery is occluded to level the bifurcation. Heavy calcifications involving the bifurcation noted. There is diminished caliber of the right ICA noted likely Reconstituted from the external carotid circulation. No focal areas of narrowing identified involving the right major of the right ICA. On the left, no dissection, arterial injury, or hemodynamically significant stenosis by NASCET criteria. VERTEBRAL ARTERIES: Moderate plaque and mild-to-moderate focal narrowing involving the ostium of the bilateral vertebral arteries.   No dissection, arterial injury, or significant stenosis. SOFT TISSUES: The lung apices are clear. No cervical or superior mediastinal lymphadenopathy. The larynx and pharynx are unremarkable. No acute abnormality of the salivary and thyroid glands. BONES: Multilevel degenerate change. CTA HEAD: ANTERIOR CIRCULATION: No significant stenosis of the intracranial internal carotid, anterior cerebral, or middle cerebral arteries. 3 mm aneurysm right A-comm. Jennifer Maid POSTERIOR CIRCULATION: No significant stenosis of the vertebral, basilar, or posterior cerebral arteries. No aneurysm. OTHER: No dural venous sinus thrombosis on this non-dedicated study     1. No acute intracranial abnormality. Chronic microvascular disease with remote right posterior MCA distribution infarct. 2. Right common carotid occlusion with reconstitution of the right ICA at the level the right-sided bifurcation, likely from the external carotid circulation. 3. Mild-to-moderate narrowing involving the both proximal ostium of the vertebral arteries otherwise large vessel occlusion or hemodynamic stenosis. 4. 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      EKG this visit:  Reviewed     Electronically signed by MILAN Perdomo CNP on 5/11/2021 at 8:55 PM

## 2021-05-12 NOTE — CARE COORDINATION
CM attempted to meet with pt to initiate discharge planning. Pt out of room - in MRI at this time. Per chart review, pt is from home w/ his spouse and was independent PTA. Pt has PCP and insurance with RX coverage. CM anticipates pt will return home w/ spouse with no needs from CM. CM available should needs present for discharge.

## 2021-05-12 NOTE — CONSULTS
could complete in standing with SUP for safety. · UB bathing: Evelin  recommend pt complete in seated when experiencing dizziness. Setup and increased time. · LB bathing: Evelin  recommend pt complete in seated when experiencing dizziness. Setup and increased time. · UB dressing: Evelin  recommend pt complete in seated when experiencing dizziness. Setup and increased time. · LB dressing: Evelin  recommend pt complete in seated when experiencing dizziness. Setup and increased time. · Toileting: SBA for safety during toilet transfers and while balancing in standing to complete rohan care and LB clothing manipulation. Cognitive and Psychosocial Functioning:  · Overall cognitive status: Oriented to time, date, person, place, city  · Affect: Normal     Balance:   · Sitting: Supervision (pt sat EOB demo good dynamic sitting balance). · Standing: SBA - SUP (pt stood without use of AD. Demo good dynamic standing balance). Functional Mobility:   · Bed Mobility: Evelin (pt performed supine to seated bed mobility from flat bed with setup and increased time). · Transfers: SBA  (pt performed STS from EOB with SBA and no AD, VC throughout for sequencing and increased time). · Ambulation: SBA (pt ambulated approx 300ft without use of AD. Demo good pace throughout and 0 LOB). AM-PAC 6 click short form for inpatient daily activity:   How much help from another person does the patient currently need. .. Unable  Dep A Lot  Max A A Lot   Mod A A Little  Min A A Little   CGA  SBA None   Mod I  Indep  Sup   1. Putting on and taking off regular lower body clothing? [] 1    [] 2   [] 2   [] 3   [] 3   [x] 4      2. Bathing (including washing, rinsing, drying)? [] 1   [] 2   [] 2 [] 3 [] 3 [x] 4   3. Toileting, which includes using toilet, bedpan, or urinal? [] 1    [] 2   [] 2   [] 3   [x] 3   [] 4     4. Putting on and taking off regular upper body clothing? [] 1   [] 2   [] 2   [] 3   [] 3    [x] 4      5.  Taking care of personal grooming such as brushing teeth? [] 1   [] 2    [] 2 [] 3    [] 3   [x] 4      6. Eating meals? [] 1   [] 2   [] 2   [] 3   [] 3   [x] 4      Raw Score:  23     [24=0% impaired(CH), 23=1-19%(CI), 20-22=20-39%(CJ), 15-19=40-59%(CK), 10-14=60-79%(CL), 7-9=80-99%(CM), 6=100%(CN)]    Treatment:  Therapeutic Activity Training:   Therapeutic activity training was instructed today. Cues were given for safety, sequence, UE/LE placement, awareness, and balance. Activities performed today included bed mobility training, sup-sit, sit-stand, ambulation. Safety Measures: Gait belt used, Left with transport staff, Alarm in place    Assessment:  Assessment  Treatment Diagnosis: dizziness  Prognosis: Good  Decision Making: Low Complexity  REQUIRES OT FOLLOW UP: No  Discharge Recommendations: Home with assist PRN    Pt is a 61year old M admitted for dizziness. Pt reports that prior to admission he was IND in ADLs, IADLs, and functional mobility. This date, pt demo good functional mobility for ADL status and appears to be functioning at or near baseline. Pt reports symptoms have mostly resolved aside from slightly blurred vision. Vision assessment (tracking, peripheral, acuity) performed and vision appears [facility] for ADL status. No OT needs at this time. Re-order if symptoms change. Recommend d/c home with initial 24 hour SUP/assist, PRN assist.    Time:   Time in: 920  Time out: 938  Timed treatment minutes: 8  Total time: 18      Electronically signed by:       TAWNY Rodriguez/L, [address] [facility], .[id number]

## 2021-05-12 NOTE — PROGRESS NOTES
Hospitalist Progress Note      Name:  Ayde Ruiz /Age/Sex: 1955  (61 y.o. male)   MRN & CSN:  [id number] & [id number] Admission Date/Time: 2021  4:51 PM   Location:  4888/5623- PCP: Anderson De MD         Hospital Day: 2    Assessment and Plan:   Ayde Ruiz is a 61 y.o.  male  who presents with dizziness.    -Dizziness  R/o acute CVA  CT head-no acute abnormality. Remote right posterior MCA infarct. CTA head and neck-right common carotid occlusion. Mild to moderate narrowing involving both the proximal ostia of the vertebral arteries. Plan  MRI head  Echocardiogram  Neurology consult  Continue aspirin, ticagrelor and statin    -Right common carotid occlusion: Vascular surgery consultation. -CAD s/p remote LAD stent: Continue DAPT and Ranexa  -Hypertension: Continue Lopressor, lisinopril  -Cigarette smoker: Nicotine patch. Smoking cessation counseling provided. Diet DIET CARDIAC;   DVT Prophylaxis [x] Lovenox, []  Heparin, [] SCDs, [] Ambulation   GI Prophylaxis [] PPI,  [] H2 Blocker,  [] Carafate,  [] Diet/Tube Feeds   Code Status Full Code   Disposition  pending PT/OT evaluation   MDM [] Low, [x] Moderate,[]  High     History of Present Illness:   Patient seen and examined. Still complains of dizziness while in bed. MRI brain pending. Ten point ROS reviewed negative, unless as noted above    Objective:   No intake or output data in the 24 hours ending 21 0920   Vitals:   Vitals:    21 0756   BP: (!) 127/93   Pulse: 71   Resp: 16   Temp: 98.6 °F (37 °C)   SpO2: 98%     Physical Exam:   GEN Awake male, sitting upright in bed. RESP breathing comfortably on room air. CARDIO/VASC S1/S2 auscultated. Regular rate without appreciable murmurs. No peripheral edema. GI Abdomen is soft without significant tenderness, masses, or guarding. Bowel sounds are normoactive. MSK No gross joint deformities. SKIN Normal coloration, warm, dry.   NEURO Cranial nerves appear grossly intact, normal speech, no lateralizing weakness. PSYCH Awake, alert, oriented x 3.     Medications:   Medications:    traZODone  100 mg Oral Nightly    metoprolol tartrate  25 mg Oral BID    aspirin  81 mg Oral Daily    rOPINIRole  2 mg Oral BID    lisinopril  2.5 mg Oral Daily    ticagrelor  90 mg Oral BID    ranolazine  500 mg Oral BID    sodium chloride flush  5-40 mL Intravenous BID    enoxaparin  40 mg Subcutaneous Daily    atorvastatin  80 mg Oral Nightly    nicotine  1 patch Transdermal Daily      Infusions:    sodium chloride       PRN Meds: sodium chloride flush, 5-40 mL, PRN  sodium chloride, 25 mL, PRN  promethazine, 12.5 mg, Q6H PRN    Or  ondansetron, 4 mg, Q6H PRN  polyethylene glycol, 17 g, Daily PRN  HYDROcodone-acetaminophen, 1 tablet, Q8H PRN        CBC   Recent Labs     05/11/21  1712 05/12/21  0326   WBC 4.2 4.4   HGB 12.2* 11.9*   HCT 37.3* 36.6*    169      BMP   Recent Labs     05/11/21  1808 05/12/21  0326   * 139   K 4.1 4.0    103   CO2 22 27   BUN 14 15   CREATININE 1.1 1.2       Radiology report reviewed    Electronically signed by Harjit Castro MD on 5/12/2021 at 9:20 AM

## 2021-05-12 NOTE — CONSULTS
KNEE SURGERY      REVISION COLOSTOMY      SHOULDER SURGERY       Medications:  Scheduled Meds:   traZODone  100 mg Oral Nightly    metoprolol tartrate  25 mg Oral BID    aspirin  81 mg Oral Daily    rOPINIRole  2 mg Oral BID    lisinopril  2.5 mg Oral Daily    ticagrelor  90 mg Oral BID    ranolazine  500 mg Oral BID    sodium chloride flush  5-40 mL Intravenous BID    enoxaparin  40 mg Subcutaneous Daily    atorvastatin  80 mg Oral Nightly    nicotine  1 patch Transdermal Daily     Continuous Infusions:   sodium chloride       PRN Meds:.sodium chloride flush, sodium chloride, promethazine **OR** ondansetron, polyethylene glycol, HYDROcodone-acetaminophen    Allergies   Allergen Reactions    Codeine Nausea And Vomiting    Amiodarone     Dicyclomine      Social History     Socioeconomic History    Marital status: Legally      Spouse name: Not on file    Number of children: Not on file    Years of education: Not on file    Highest education level: Not on file   Occupational History    Not on file   Social Needs    Financial resource strain: Not on file    Food insecurity     Worry: Not on file     Inability: Not on file    Transportation needs     Medical: Not on file     Non-medical: Not on file   Tobacco Use    Smoking status: Current Every Day Smoker     Packs/day: 1.00     Types: Cigarettes    Smokeless tobacco: Never Used   Substance and Sexual Activity    Alcohol use: No    Drug use: No    Sexual activity: Yes     Partners: Female   Lifestyle    Physical activity     Days per week: Not on file     Minutes per session: Not on file    Stress: Not on file   Relationships    Social connections     Talks on phone: Not on file     Gets together: Not on file     Attends Mosque service: Not on file     Active member of club or organization: Not on file     Attends meetings of clubs or organizations: Not on file     Relationship status: Not on file    Intimate partner violence     Fear of current or ex partner: Not on file     Emotionally abused: Not on file     Physically abused: Not on file     Forced sexual activity: Not on file   Other Topics Concern    Not on file   Social History Narrative    Not on file      History reviewed. No pertinent family history. Review of Symptoms:    14-point system review completed. All of which are negative except as mentioned above. Physical Exam:           Gen: A&O x 4, NAD, cooperative  HEENT: NC/AT, EOMI, PERRL, mmm, neck supple, no meningeal signs; Heart: regular   Lungs: no distress  Ext: no edema, no calf tenderness b/l  Psych: normal mood and affect  Skin: no rashes or lesions    NEUROLOGIC EXAM:    Mental Status: A&O to self, location, month and year, NAD, speech clear, language fluent, repetition and naming intact, follows commands appropriately    Cranial Nerve Exam:   CN II-XII:  PERRL, VFF, no nystagmus, no gaze paresis, sensation V1-V3 intact b/l, muscles of facial expression symmetric; hearing intact to conversational tone, palate elevates symmetrically, shoulder elevation symmetric and tongue protrudes midline with movement side to side. Motor Exam:       Strength 5/5 UE's/LE's b/l  Tone and bulk normal   No pronator drift    Deep Tendon Reflexes: 2/4 biceps, triceps, brachioradialis, patellar, and achilles b/l; flexor plantar responses b/l    Sensation: Intact light touch/temp UE's/LE's b/l    Coordination/Cerebellum:       Tremors--none      Rapidly alternating movements: no dysdiadochokinesia b/l                Finger-to-Nose: no dysmetria b/l    Gait and stance:      Gait: steady      LABS:     Recent Labs     05/11/21  1712 05/11/21  1808 05/12/21  0326   WBC 4.2  --  4.4   NA  --  134* 139   K  --  4.1 4.0   CL  --  104 103   CO2  --  22 27   BUN  --  14 15   CREATININE  --  1.1 1.2   GLUCOSE  --  93 92     Results for Car Smith (MRN [id number]) as of 5/12/2021 12:28   Ref.  Range 5/12/2021 03:26 Glucose Latest Ref Range: 70 - 99 MG/DL 92   Hemoglobin A1C Latest Ref Range: 4.2 - 6.3 % 5.4   eAG (mg/dL) Latest Units: mg/dL 108       IMAGING:      ECHO:   Summary   Left ventricular systolic function is low normal.   Ejection fraction is visually estimated at 45-50%. Grade I diastolic dysfunction. Mitral annular calcification is present. No evidence of any pericardial effusion. Inadequate bubble study to determine PFO. MRI/MRA:  1. No acute intracranial abnormality.  No acute infarct. 2. Small chronic infarct involving the right parietal lobe. 3. Mild global parenchymal volume loss with chronic microvascular ischemic   changes. 4. Loss of the normal signal void within the right internal carotid artery,   which is likely due to slow flow given asymmetrically decreased flow related   enhancement seen within the right ICA on the MRA images. 5. No convincing focal stenosis of the [id number]. 6. The aneurysm in the region of the anterior communicating artery on the   prior CTA is not well visualized on this exam.     CTA:  1. No acute intracranial abnormality.  Chronic microvascular disease with   remote right posterior MCA distribution infarct. 2. Right common carotid occlusion with reconstitution of the right ICA at the   level the right-sided bifurcation, likely from the external carotid   circulation. 3. Mild-to-moderate narrowing involving the both proximal ostium of the   vertebral arteries otherwise large vessel occlusion or hemodynamic stenosis. 4. 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 ASSESSMENT/PLAN:     3 61year old male with reported lightheadedness, no PCA shayan identified, there is remote R-MCA infarction, various areas of vascular stenosis that needs to be addressed with neuro intervention outpatient. Low suspicion right carotid occlusion is causing stenosis. Obtain orthostatics. MRI, MRA, ECHO and CTA as above. Patient tells me he has hx of A.  Fib, if this is true, need to be on [address] with hx of stroke, if not Brilinta, ASA and Statin sufficient. PT/OT/ST. Will continue to follow. Thank you for allowing us to participate in the care of your patient. If there are any questions regarding evaluation please feel free to contact us. MILAN Sheehan - CNP, 5/12/2021  Discussed and agree.    Prasanna Rosario, DO

## 2021-05-12 NOTE — ED PROVIDER NOTES
EKG:  Rate 66. SD interval 138, , QTc 434. No ST elevations or depressions. Nonspecific T waves. Q waves in the inferior leads. Impression: Abnormal EKG. When compared previous EKG from 8/8/2017, there are no significant changes.       Chani Ambrose MD  05/11/21 2052

## 2021-05-13 VITALS
WEIGHT: 186.2 LBS | HEIGHT: 73 IN | OXYGEN SATURATION: 98 % | SYSTOLIC BLOOD PRESSURE: 114 MMHG | RESPIRATION RATE: 16 BRPM | BODY MASS INDEX: 24.68 KG/M2 | TEMPERATURE: 98 F | DIASTOLIC BLOOD PRESSURE: 80 MMHG | HEART RATE: 59 BPM

## 2021-05-13 PROCEDURE — 6370000000 HC RX 637 (ALT 250 FOR IP): Performed by: NURSE PRACTITIONER

## 2021-05-13 PROCEDURE — 6370000000 HC RX 637 (ALT 250 FOR IP): Performed by: PHYSICIAN ASSISTANT

## 2021-05-13 PROCEDURE — 6360000002 HC RX W HCPCS: Performed by: NURSE PRACTITIONER

## 2021-05-13 PROCEDURE — G0378 HOSPITAL OBSERVATION PER HR: HCPCS

## 2021-05-13 PROCEDURE — 6370000000 HC RX 637 (ALT 250 FOR IP): Performed by: INTERNAL MEDICINE

## 2021-05-13 PROCEDURE — 2580000003 HC RX 258: Performed by: NURSE PRACTITIONER

## 2021-05-13 PROCEDURE — 94761 N-INVAS EAR/PLS OXIMETRY MLT: CPT

## 2021-05-13 PROCEDURE — 96372 THER/PROPH/DIAG INJ SC/IM: CPT

## 2021-05-13 RX ADMIN — ATORVASTATIN CALCIUM 80 MG: 80 TABLET, FILM COATED ORAL at 09:10

## 2021-05-13 RX ADMIN — METOPROLOL TARTRATE 25 MG: 25 TABLET, FILM COATED ORAL at 09:09

## 2021-05-13 RX ADMIN — HYDROCODONE BITARTRATE AND ACETAMINOPHEN 1 TABLET: 7.5; 325 TABLET ORAL at 05:03

## 2021-05-13 RX ADMIN — TICAGRELOR 90 MG: 90 TABLET ORAL at 09:10

## 2021-05-13 RX ADMIN — HYDROCODONE BITARTRATE AND ACETAMINOPHEN 1 TABLET: 7.5; 325 TABLET ORAL at 12:13

## 2021-05-13 RX ADMIN — ENOXAPARIN SODIUM 40 MG: 40 INJECTION SUBCUTANEOUS at 09:08

## 2021-05-13 RX ADMIN — RANOLAZINE 500 MG: 500 TABLET, EXTENDED RELEASE ORAL at 09:09

## 2021-05-13 RX ADMIN — SODIUM CHLORIDE, PRESERVATIVE FREE 10 ML: 5 INJECTION INTRAVENOUS at 09:10

## 2021-05-13 RX ADMIN — ASPIRIN 81 MG: 81 TABLET, CHEWABLE ORAL at 09:08

## 2021-05-13 ASSESSMENT — PAIN DESCRIPTION - DESCRIPTORS: DESCRIPTORS: STABBING

## 2021-05-13 ASSESSMENT — PAIN DESCRIPTION - FREQUENCY: FREQUENCY: CONTINUOUS

## 2021-05-13 ASSESSMENT — PAIN SCALES - GENERAL
PAINLEVEL_OUTOF10: 6
PAINLEVEL_OUTOF10: 2

## 2021-05-13 ASSESSMENT — PAIN DESCRIPTION - PROGRESSION: CLINICAL_PROGRESSION: NOT CHANGED

## 2021-05-13 NOTE — CONSULTS
Department of Cardiovascular & Thoracic Surgery   Consult Note    Reason for Consult:  R common carotid occlusion     Requesting Physician: Dr. Paige Amador    Date of Consult: 5/13/21      History Obtained From:  patient     HISTORY OF PRESENT ILLNESS:    The patient is a 61 y.o. male who presents with dizziness and blurred vision. His symptoms have been ongoing for the past month. He states he visited his cardiologist who checked his carotids and sent him to the ED. He denies any focal weakness. He has also been having headaches and nausea. He is a current smoker.      Past Medical History:        Diagnosis Date    Arthritis     CAD (coronary artery disease)     Carotid occlusion, right     Cerebrovascular accident (CVA) due to occlusion of right middle cerebral artery (HCC)     Hyperlipidemia     Hypertension     Pulmonary fibrosis ([address]     Restless leg syndrome      Past Surgical History:        Procedure Laterality Date    APPENDECTOMY      CARDIAC SURGERY      COLONOSCOPY      CORONARY ANGIOPLASTY WITH STENT PLACEMENT      7 stents    HERNIA REPAIR      KNEE SURGERY      REVISION COLOSTOMY      SHOULDER SURGERY       Current Medications:   Current Facility-Administered Medications: traZODone (DESYREL) tablet 100 mg, 100 mg, Oral, Nightly  metoprolol tartrate (LOPRESSOR) tablet 25 mg, 25 mg, Oral, BID  HYDROcodone-acetaminophen (NORCO) 7.5-325 MG per tablet 1 tablet, 1 tablet, Oral, Q6H PRN  lidocaine 4 % external patch 1 patch, 1 patch, Transdermal, Nightly  diclofenac sodium (VOLTAREN) 1 % gel 2 g, 2 g, Topical, BID PRN  aspirin chewable tablet 81 mg, 81 mg, Oral, Daily  rOPINIRole (REQUIP) tablet 2 mg, 2 mg, Oral, BID  lisinopril (PRINIVIL;ZESTRIL) tablet 2.5 mg, 2.5 mg, Oral, Daily  ticagrelor (BRILINTA) tablet 90 mg, 90 mg, Oral, BID  ranolazine (RANEXA) extended release tablet 500 mg, 500 mg, Oral, BID  sodium chloride flush 0.9 % injection 5-40 mL, 5-40 mL, Intravenous, BID  sodium chloride flush 0.9 % injection 5-40 mL, 5-40 mL, Intravenous, PRN  0.9 % sodium chloride infusion, 25 mL, Intravenous, PRN  promethazine (PHENERGAN) tablet 12.5 mg, 12.5 mg, Oral, Q6H PRN **OR** ondansetron (ZOFRAN) injection 4 mg, 4 mg, Intravenous, Q6H PRN  polyethylene glycol (GLYCOLAX) packet 17 g, 17 g, Oral, Daily PRN  enoxaparin (LOVENOX) injection 40 mg, 40 mg, Subcutaneous, Daily  atorvastatin (LIPITOR) tablet 80 mg, 80 mg, Oral, Nightly  nicotine (NICODERM CQ) 21 MG/24HR 1 patch, 1 patch, Transdermal, Daily  Allergies:     Allergies   Allergen Reactions    Codeine Nausea And Vomiting    Amiodarone     Dicyclomine        Social History:   Social History     Socioeconomic History    Marital status: Legally      Spouse name: Not on file    Number of children: Not on file    Years of education: Not on file    Highest education level: Not on file   Occupational History    Not on file   Social Needs    Financial resource strain: Not on file    Food insecurity     Worry: Not on file     Inability: Not on file   [employer] needs     Medical: Not on file     Non-medical: Not on file   Tobacco Use    Smoking status: Current Every Day Smoker     Packs/day: 1.00     Types: Cigarettes    Smokeless tobacco: Never Used   Substance and Sexual Activity    Alcohol use: No    Drug use: No    Sexual activity: Yes     Partners: Female   Lifestyle    Physical activity     Days per week: Not on file     Minutes per session: Not on file    Stress: Not on file   Relationships    Social connections     Talks on phone: Not on file     Gets together: Not on file     Attends Scientologist service: Not on file     Active member of club or organization: Not on file     Attends meetings of clubs or organizations: Not on file     Relationship status: Not on file    Intimate partner violence     Fear of current or ex partner: Not on file     Emotionally abused: Not on file     Physically abused: Not on file     Forced sexual activity: Not on file   Other Topics Concern    Not on file   Social History Narrative    Not on file       Family History:    History reviewed. No pertinent family history. REVIEW OF SYSTEMS:  Constitutional: - fatigue, - fever, - chills, - night sweats  Eyes: - vision loss  Cardiovascular: -  chest pain, - palpitations, - leg swelling, - leg pain   Respiratory: - cough, - shortness of breath, - wheezing   GI: - nausea, - vomiting, - abdominal pain, - constipation, - diarrhea   : - dysuria   MSK: - joint pain, - muscle pain  Integument: - rash, - skin color change   Heme: - easy bruising or bleeding  Neurologic: - headache, - weakness, + dizziness, - paresthesias       EXAM:  Constitutional: Blood pressure 114/80, pulse 59, temperature 98 °F (36.7 °C), temperature source Oral, resp. rate 16, height 6' 1\" (1.854 m), weight 186 lb 3.2 oz (84.5 kg), SpO2 98 %. No apparent distress, appears stated age and cooperative. Neurologic: follows commands, no focal weakness noted   Lungs: Good respiratory effort. Clear to auscultation,   CV: Regular rate/ rhythm , no peripheral edema, feet warm and well perfused  GI: Soft, non-tender in all four quadrants, non-distended, + bowel sounds, liver and spleen no palpable masses  : bladder nondistended   MSK: no obvious deformity   Skin: warm, pink and dry       DATA:  CTA  Impression   1. No acute intracranial abnormality.  Chronic microvascular disease with   remote right posterior MCA distribution infarct. 2. Right common carotid occlusion with reconstitution of the right ICA at the   level the right-sided bifurcation, likely from the external carotid   circulation. 3. Mild-to-moderate narrowing involving the both proximal ostium of the   vertebral arteries otherwise large vessel occlusion or hemodynamic stenosis. 4. 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          IMPRESSION  Patient Active Problem List   Diagnosis    Pulmonary fibrosis ([address]    Coronary atherosclerosis of native coronary artery    Essential hypertension, benign    Mixed hyperlipidemia    NSTEMI (non-ST elevated myocardial infarction) ([facility]    Smoker    Postoperative urinary retention    Ileus following gastrointestinal surgery (HCC)    Dizziness    Cerebrovascular accident (CVA) due to occlusion of right middle cerebral artery ([facility]    Carotid occlusion, right       R common carotid occlusion       RECOMMENDATIONS:  No intervention indicated for the carotid occlusion. Recommend yearly OP surveillance of the L side. Pt seen with Dr. Anjelica Zimmer PA-C

## 2021-05-13 NOTE — PROGRESS NOTES
Hospitalist Progress Note      Name:  Patrica Pal /Age/Sex: 1955  (61 y.o. male)   MRN & CSN:  [id number] & [id number] Admission Date/Time: 2021  4:51 PM   Location:  [address] [place] [address] PCP: Ania Whipple MD         Hospital Day: 3    Assessment and Plan:   Patrica Pal is a 61 y.o.  male  who presents with dizziness.    -Dizziness: Much better. No acute infarct on MRI brain. CT head-no acute abnormality. Remote right posterior MCA infarct. CTA head and neck-right common carotid occlusion. Mild to moderate narrowing involving both the proximal ostia of the vertebral arteries. Echocardiogram shows EF of 45 to 50% with grade 1 DD. Bubble study inadequate to rule out PFO. Plan  Continue aspirin, ticagrelor and statin. Neurology recommendation appreciated. -Right common carotid occlusion: Vascular surgery recommendation awaited. -3 mm aneurysm of the right anterior communicating artery. Noted on CTA head. Not well visualized on MRA head. No acute neurosurgical intervention at this time per neurosurgeon. Outpatient follow-up with [facility] neurosurgery recommended. -CAD s/p remote LAD stent: Continue DAPT and Ranexa. -Hypertension: Continue Lopressor, lisinopril.    -Cigarette smoker: Nicotine patch. Smoking cessation counseling provided. Diet DIET CARDIAC;   DVT Prophylaxis [x] Lovenox, []  Heparin, [] SCDs, [] Ambulation   GI Prophylaxis [] PPI,  [] H2 Blocker,  [] Carafate,  [] Diet/Tube Feeds   Code Status Full Code   Disposition  Home   MDM [] Low, [x] Moderate,[]  High     History of Present Illness:   Patient seen and examined. He denies dizziness. Vascular surgery evaluation still pending. Ten point ROS reviewed negative, unless as noted above    Objective:        Intake/Output Summary (Last 24 hours) at 2021 1030  Last data filed at 2021  Gross per 24 hour   Intake 10 ml   Output --   Net 10 ml      Vitals:   Vitals:    21 0900   BP: 111/68

## 2021-05-13 NOTE — DISCHARGE SUMMARY
Discharge Summary    Name:  Marbin Lockett /Age/Sex: 1955  (61 y.o. male)   MRN & CSN:  [id number] & [id number] Admission Date/Time: 2021  4:51 PM   Attending:  Rena Beckman MD Discharging Physician: Rena Beckman MD     Hospital Course: Marbin Lockett is a 61 y.o.  male  who presented with dizziness.     -Dizziness: Much better at discharge. No acute infarct on MRI brain. CT head-no acute abnormality. Remote right posterior MCA infarct. CTA head and neck showed right common carotid occlusion. Mild to moderate narrowing involving both the proximal ostia of the vertebral arteries. Echocardiogram shows EF of 45 to 50% with grade 1 DD. Bubble study inadequate to rule out PFO. Home DAPT was continued in addition to statin therapy.      -Right common carotid occlusion: Vascular surgery recommends to continue surveillance. No intervention recommended at this time.      -3 mm aneurysm of the right anterior communicating artery. Noted on CTA head. Not well visualized on MRA head. No acute neurosurgical intervention advised by neurosurgeon but did recommend outpatient follow-up with [facility] neurosurgery.     -CAD s/p remote LAD stent: Continue DAPT and Ranexa. -Hypertension: Continue Lopressor, lisinopril. The patient expressed appropriate understanding of and agreement with the discharge recommendations, medications, and plan. Consults this admission:  IP CONSULT TO HOSPITALIST  IP CONSULT TO NEUROLOGY  IP CONSULT TO VASCULAR SURGERY    Discharge Instruction:   Jean Marie Fatima MD. Schedule an appointment as soon as possible for a   visit in 2 weeks. Specialty: Family Medicine  Contact information:  [facility] Lujan MD. Schedule an appointment as soon as possible for a   visit in 6 months.     Specialty: Cardiothoracic Surgery  Why: for monitoring of carotid occlusion  Contact information:  [facility] [address]  [phone]                   Diet:  cardiac diet   Activity: activity as tolerated  Disposition: Discharged to:   [x]Home, []C, []SNF, []Acute Rehab, []Hospice   Condition on discharge: Stable    Discharge Medications:      Blaine Gaspar   Home Medication Instructions TFP:[id number]    Printed on:05/13/21 8105   Medication Information                      aspirin 81 MG chewable tablet  Take 81 mg by mouth daily. dicyclomine (BENTYL) 20 MG tablet  Take 1 tablet by mouth 4 times daily (before meals and nightly)             metoprolol tartrate (LOPRESSOR) 25 MG tablet  Take 25 mg by mouth 2 times daily             ranolazine (RANEXA) 500 MG extended release tablet  Take 500 mg by mouth 2 times daily             rOPINIRole (REQUIP) 1 MG tablet  Take 2 mg by mouth 2 times daily              simvastatin (ZOCOR) 40 MG tablet  Take 40 mg by mouth nightly. ticagrelor (BRILINTA) 90 MG TABS tablet  Take 90 mg by mouth 2 times daily             traZODone (DESYREL) 100 MG tablet  Take 100 mg by mouth nightly                 Objective Findings at Discharge:   /80   Pulse 59   Temp 98 °F (36.7 °C) (Oral)   Resp 16   Ht 6' 1\" (1.854 m)   Wt 186 lb 3.2 oz (84.5 kg)   SpO2 98%   BMI 24.57 kg/m²            PHYSICAL EXAM  GEN    Awake male, sitting upright in bed. RESP  breathing comfortably on room air. CARDIO/VASC           S1/S2 auscultated. Regular rate without appreciable murmurs. No peripheral edema. GI        Abdomen is soft without significant tenderness, masses, or guarding. Bowel sounds are normoactive. MSK    No gross joint deformities. SKIN    Normal coloration, warm, dry. NEURO           Cranial nerves appear grossly intact, normal speech, no lateralizing weakness. PSYCH            Awake, alert, oriented x 3.     BMP/CBC  Recent Labs     05/11/21  1712 05/11/21  1808 05/12/21  0326   NA  --  134* 139   K  --

## 2021-05-21 NOTE — PROGRESS NOTES
Physician Progress Note      PATIENT:               Bernardo Lora  CSN #:                  [id number]  :                       1955  ADMIT DATE:       2021 4:51 PM  [address] [place] DATE:        2021 3:56 PM  RESPONDING  PROVIDER #:        Tiffanie Martinez MD          QUERY TEXT:    Patient admitted with dizziness. noted to have R Common Carotid Occlusion and   Remote R Posterior MCA infarct. If possible, please document in progress notes   and discharge summary if you are evaluating and/or treating any of the   following: The medical record reflects the following:  Risk Factors: HTN, HLD  Clinical Indicators: CTA Head and Neck: The right common carotid artery is   occluded to level the bifurcation. Remote R Posterior MCA Infarct. Pt c/o   consistent dizziness  Treatment: monitoring    Thank you,  Jeanette Rhodes RN  Options provided:  -- Dizziness due to Remote R Posterior MCA Infarct  -- Dizziness due to R Common Carotid Occlusion  -- Other - I will add my own diagnosis  -- Disagree - Not applicable / Not valid  -- Disagree - Clinically unable to determine / Unknown  -- Refer to Clinical Documentation Reviewer    PROVIDER RESPONSE TEXT:    Provider is clinically unable to determine a response to this query.     Query created by: Alvin Cooper on 2021 4:02 PM      Electronically signed by:  Tiffanie Martinez MD 2021 2:40 PM

## 2022-07-22 ENCOUNTER — HOSPITAL ENCOUNTER (OUTPATIENT)
Age: 67
Discharge: HOME | End: 2022-07-22
Payer: MEDICARE

## 2022-07-22 ENCOUNTER — HOSPITAL ENCOUNTER (OUTPATIENT)
Dept: GENERAL RADIOLOGY | Age: 67
Discharge: HOME | End: 2022-07-22
Payer: MEDICARE

## 2022-07-22 DIAGNOSIS — R07.81 ANTERIOR PLEURITIC PAIN: ICD-10-CM

## 2022-07-22 DIAGNOSIS — R05.9 COMPLAINING OF COUGH: ICD-10-CM

## 2022-07-22 PROCEDURE — 71046 X-RAY EXAM CHEST 2 VIEWS: CPT

## 2023-08-09 ENCOUNTER — HOSPITAL ENCOUNTER (OUTPATIENT)
Dept: GENERAL RADIOLOGY | Age: 68
Discharge: HOME | End: 2023-08-09
Payer: COMMERCIAL

## 2023-08-09 ENCOUNTER — HOSPITAL ENCOUNTER (OUTPATIENT)
Age: 68
Discharge: HOME | End: 2023-08-09
Payer: COMMERCIAL

## 2023-08-09 DIAGNOSIS — R05.1 ACUTE COUGH: ICD-10-CM

## 2023-08-09 PROCEDURE — 71046 X-RAY EXAM CHEST 2 VIEWS: CPT

## 2023-08-29 ENCOUNTER — HOSPITAL ENCOUNTER (OUTPATIENT)
Dept: GENERAL RADIOLOGY | Age: 68
Discharge: HOME | End: 2023-08-29
Payer: COMMERCIAL

## 2023-08-29 ENCOUNTER — HOSPITAL ENCOUNTER (OUTPATIENT)
Age: 68
Discharge: HOME | End: 2023-08-29
Payer: COMMERCIAL

## 2023-08-29 DIAGNOSIS — R05.9 COUGH, UNSPECIFIED TYPE: ICD-10-CM

## 2023-08-29 PROCEDURE — 71046 X-RAY EXAM CHEST 2 VIEWS: CPT

## 2023-11-29 ENCOUNTER — HOSPITAL ENCOUNTER (OUTPATIENT)
Dept: GENERAL RADIOLOGY | Age: 68
Discharge: HOME | End: 2023-11-29
Payer: COMMERCIAL

## 2023-11-29 ENCOUNTER — HOSPITAL ENCOUNTER (OUTPATIENT)
Age: 68
Discharge: HOME | End: 2023-11-29
Payer: COMMERCIAL

## 2023-11-29 DIAGNOSIS — M25.532 PAIN IN LEFT WRIST: ICD-10-CM

## 2023-11-29 PROCEDURE — 73110 X-RAY EXAM OF WRIST: CPT
